# Patient Record
(demographics unavailable — no encounter records)

---

## 2024-10-07 NOTE — ASSESSMENT
[FreeTextEntry1] : Tick bite start Doxycycline 100mg 2 tabs once we'll check lyme titers today  HLD Discussed the importance of following a low cholesterol/low fat diet and increased physical activity.  Blood work ordered.to check Lyme titers Follow up in one week for lab results

## 2024-10-07 NOTE — PHYSICAL EXAM
[No Acute Distress] : no acute distress [Well Nourished] : well nourished [Well Developed] : well developed [Well-Appearing] : well-appearing [Normal Sclera/Conjunctiva] : normal sclera/conjunctiva [Normal Outer Ear/Nose] : the outer ears and nose were normal in appearance [No JVD] : no jugular venous distention [No Lymphadenopathy] : no lymphadenopathy [Supple] : supple [No Respiratory Distress] : no respiratory distress  [No Accessory Muscle Use] : no accessory muscle use [Clear to Auscultation] : lungs were clear to auscultation bilaterally [Normal Rate] : normal rate  [Regular Rhythm] : with a regular rhythm [Normal S1, S2] : normal S1 and S2 [No Murmur] : no murmur heard [No Edema] : there was no peripheral edema [Soft] : abdomen soft [Non Tender] : non-tender [Non-distended] : non-distended [Normal Posterior Cervical Nodes] : no posterior cervical lymphadenopathy [Normal Anterior Cervical Nodes] : no anterior cervical lymphadenopathy [No CVA Tenderness] : no CVA  tenderness [No Spinal Tenderness] : no spinal tenderness [No Joint Swelling] : no joint swelling [Grossly Normal Strength/Tone] : grossly normal strength/tone [No Rash] : no rash [Coordination Grossly Intact] : coordination grossly intact [No Focal Deficits] : no focal deficits [Normal Gait] : normal gait [Normal Affect] : the affect was normal [Normal Insight/Judgement] : insight and judgment were intact [de-identified] : erythematous flat circular area left lateral lower leg at site of tick bite  Adult

## 2024-10-07 NOTE — HISTORY OF PRESENT ILLNESS
[de-identified] : Ms. SUSI POWERS is a 44 year old female with Hx of anemia, thalassemia trait, asthma, ovarian cyst, and HLD, who presents for an acute visit.   [place] and then went out on [facility] yesterday for apple picking.  She found a tick on her left lateral lower leg last night States she pulled out the whole tick.  Denies any SOB, CP, abdominal pain, N/V/D, headache, dizziness, or leg swelling.

## 2024-10-07 NOTE — ADDENDUM
[FreeTextEntry1] : Documented by Charla Kearns acting as a scribe for Dr. Trinity Jay. 10/07/2024   All medical record entries made by the scribe were at my, Dr. Trinity Jay, direction and personally dictated by me on 10/07/2024. I have reviewed the chart and agree that the record accurately reflects my personal performance of the history, physical exam, assessment and plan. I have also personally directed, reviewed, and agreed with the chart.

## 2024-10-30 NOTE — PLAN
[FreeTextEntry1] : See plan.  Constipation gastroenterology referral  Pain of RT great toe  statr: diclofenac sodium 1% external gel as directed podiatry referral  RT shoulder pain orthopedic surgery referral   Bloodwork ordered. Pt. instructed to follow up for lab results.

## 2024-10-30 NOTE — HISTORY OF PRESENT ILLNESS
[de-identified] : Ms. SUSI POWERS is a 44-year-old female with hx. of anemia, thalassemia trait, asthma, Ovarian cyst and HLD, who presents for an acute visit. Patient reports she has quit smoking. States she started feeling constipated along with abdominal pain after she stopped smoking and has tried OTC laxative with no improvements. Denies abdominal bloating/melena/hematochezia. Admits she does not exercise regularly. Has not had a colonoscopy before nor followed up with GI. States she uses papaverine for pain and after experiencing flatulence, abdominal pain is gone. She c/o RT shoulder pain with limited ROM as well as pain in RT big toe. Pain is severe it wakes her up at night. Has taken advil, motrin and tylenol for pain with minimal improvement. Reports having a fall before the summer and feels pain ever since, that makes her unable to lie on her side.  Pt. admits her diet recently consists of some green salads and fruits as well as high carb diet. Denies CP, SOB, HA's, dizziness. She is otherwise well and offers no additional complaints.

## 2024-10-30 NOTE — REVIEW OF SYSTEMS
[Abdominal Pain] : abdominal pain [Constipation] : constipation [Negative] : Heme/Lymph [FreeTextEntry9] : +RT great toe pain +RT shoulder pain

## 2024-11-05 NOTE — PHYSICAL EXAM
[Rad] : radial 2+ and symmetric bilaterally [Normal] : Alert and in no acute distress [Poor Appearance] : well-appearing [Acute Distress] : not in acute distress [Obese] : not obese [de-identified] : The patient has no respiratory distress. Mood and affect are normal. The patient is alert and oriented to person, place and time. Examination of the cervical spine demonstrates no tenderness, no deformity and no muscle spasm. Cervical spine rotation is 60 to the right, 60 to the left, 75 of extension and 45 of flexion. Neurologic exam of the upper extremities reveals intact sensation to light touch. Motor function is 5 over 5 in all groups. Deep tendon reflexes are 2+ and equal at the biceps, triceps and brachioradialis. Examination of the right shoulder demonstrates no deformity. The skin is intact. There is no erythema. There is tenderness anteriorly. Impingement sign is positive There is no instability. Drop arm test is negative. Empty can test is negative. Liftoff test is negative. Moultrie test is negative.  She has right shoulder elevation of 105 degrees, external rotation of 40 and internal rotation to the lower lumbar level on the right.  The elbows are stable.  There is no lymphedema. [de-identified] : AP, transscapular and axillary x-rays of the right shoulder demonstrate no fracture, no dislocation and no bony abnormality.

## 2024-11-05 NOTE — DISCUSSION/SUMMARY
[de-identified] : The patient has tendinitis of the right shoulder.  The patient has stiffness of the right shoulder.  I have discussed the pathology, natural history and treatment options with her.  She is referred for physical therapy.  She is started on a course of naproxen.  Medication risks have been reviewed.  She will be reevaluated after 1 month of therapy.

## 2024-11-05 NOTE — HISTORY OF PRESENT ILLNESS
[de-identified] : 44-year-old RHD female presents for evaluation of right shoulder pain x 5 months. She reports about 6 months ago she had taken a fall on to the right shoulder, had pain for 1 week which had resolved. She complains of intermittent stabbing pain over the lateral shoulder worse with lying on the right side, overhead movement and carrying objects. She has tried Advil and Tylenol with some relief. Denies prior injuries.

## 2024-11-12 NOTE — HISTORY OF PRESENT ILLNESS
[de-identified] : 44 years old female with history of anemia/thalassemia trait/asthma/hyperlipidemia/ovarian cyst who presents today for acute visit.  Patient complaining nasal congestion and sinus pressure that started last week.  She also reports green mucus.  She denies any fever, chills, sore throat, cough.

## 2024-11-12 NOTE — REVIEW OF SYSTEMS
[Nasal Discharge] : nasal discharge [Sore Throat] : no sore throat [Negative] : Heme/Lymph [FreeTextEntry4] : + sinus pressure

## 2024-12-09 NOTE — ADDENDUM
[FreeTextEntry1] : Documented by Charla Kearns acting as a scribe for Dr. Trinity Jay. 12/09/2024   All medical record entries made by the scribe were at my, Dr. Trinity Jay, direction and personally dictated by me on 12/09/2024. I have reviewed the chart and agree that the record accurately reflects my personal performance of the history, physical exam, assessment and plan. I have also personally directed, reviewed, and agreed with the chart.

## 2024-12-09 NOTE — PHYSICAL EXAM
[No Acute Distress] : no acute distress [Well Nourished] : well nourished [Well Developed] : well developed [Well-Appearing] : well-appearing [Normal Sclera/Conjunctiva] : normal sclera/conjunctiva [Normal Outer Ear/Nose] : the outer ears and nose were normal in appearance [Normal TMs] : both tympanic membranes were normal [No JVD] : no jugular venous distention [No Lymphadenopathy] : no lymphadenopathy [Supple] : supple [No Respiratory Distress] : no respiratory distress  [No Accessory Muscle Use] : no accessory muscle use [Clear to Auscultation] : lungs were clear to auscultation bilaterally [Normal Rate] : normal rate  [Regular Rhythm] : with a regular rhythm [Normal S1, S2] : normal S1 and S2 [No Murmur] : no murmur heard [Pedal Pulses Present] : the pedal pulses are present [No Edema] : there was no peripheral edema [No Extremity Clubbing/Cyanosis] : no extremity clubbing/cyanosis [Soft] : abdomen soft [Non Tender] : non-tender [Non-distended] : non-distended [Normal Posterior Cervical Nodes] : no posterior cervical lymphadenopathy [Normal Anterior Cervical Nodes] : no anterior cervical lymphadenopathy [No CVA Tenderness] : no CVA  tenderness [No Spinal Tenderness] : no spinal tenderness [No Joint Swelling] : no joint swelling [Grossly Normal Strength/Tone] : grossly normal strength/tone [No Rash] : no rash [Coordination Grossly Intact] : coordination grossly intact [No Focal Deficits] : no focal deficits [Normal Gait] : normal gait [Normal Affect] : the affect was normal [Normal Insight/Judgement] : insight and judgment were intact [de-identified] : + PND

## 2024-12-09 NOTE — ASSESSMENT
[FreeTextEntry1] : Sinusitis Augmentin 875-125mg BID with food has Flonase at home increase po fluids  Pt to follow up in one week or sooner if symptoms persist or worsen.

## 2024-12-09 NOTE — PHYSICAL EXAM
[No Acute Distress] : no acute distress [Well Nourished] : well nourished [Well Developed] : well developed [Well-Appearing] : well-appearing [Normal Sclera/Conjunctiva] : normal sclera/conjunctiva [Normal Outer Ear/Nose] : the outer ears and nose were normal in appearance [Normal TMs] : both tympanic membranes were normal [No JVD] : no jugular venous distention [No Lymphadenopathy] : no lymphadenopathy [Supple] : supple [No Respiratory Distress] : no respiratory distress  [No Accessory Muscle Use] : no accessory muscle use [Clear to Auscultation] : lungs were clear to auscultation bilaterally [Normal Rate] : normal rate  [Regular Rhythm] : with a regular rhythm [Normal S1, S2] : normal S1 and S2 [No Murmur] : no murmur heard [Pedal Pulses Present] : the pedal pulses are present [No Edema] : there was no peripheral edema [No Extremity Clubbing/Cyanosis] : no extremity clubbing/cyanosis [Soft] : abdomen soft [Non Tender] : non-tender [Non-distended] : non-distended [Normal Posterior Cervical Nodes] : no posterior cervical lymphadenopathy [Normal Anterior Cervical Nodes] : no anterior cervical lymphadenopathy [No CVA Tenderness] : no CVA  tenderness [No Spinal Tenderness] : no spinal tenderness [No Joint Swelling] : no joint swelling [Grossly Normal Strength/Tone] : grossly normal strength/tone [No Rash] : no rash [Coordination Grossly Intact] : coordination grossly intact [No Focal Deficits] : no focal deficits [Normal Gait] : normal gait [Normal Affect] : the affect was normal [Normal Insight/Judgement] : insight and judgment were intact [de-identified] : + PND

## 2024-12-09 NOTE — HISTORY OF PRESENT ILLNESS
[de-identified] : Ms. SUSI POWERS is a 44 year old female with Hx of anemia, thalassemia trait, asthma, hyperlipidemia, who presents for an acute visit.   Pt c/o sinus pressure and mucus in the back of her throat that started last Wednesday.  Denies any SOB, CP, abdominal pain, N/V/D, headache, dizziness, or leg swelling.

## 2024-12-09 NOTE — HISTORY OF PRESENT ILLNESS
[de-identified] : Ms. SUSI POWERS is a 44 year old female with Hx of anemia, thalassemia trait, asthma, hyperlipidemia, who presents for an acute visit.   Pt c/o sinus pressure and mucus in the back of her throat that started last Wednesday.  Denies any SOB, CP, abdominal pain, N/V/D, headache, dizziness, or leg swelling.

## 2024-12-09 NOTE — ADDENDUM
[FreeTextEntry1] : Documented by Charla Kearns acting as a scribe for Dr. Trinity Jay. 12/09/2024   All medical record entries made by the scribe were at my, Dr. rTinity Jay, direction and personally dictated by me on 12/09/2024. I have reviewed the chart and agree that the record accurately reflects my personal performance of the history, physical exam, assessment and plan. I have also personally directed, reviewed, and agreed with the chart.

## 2025-05-05 NOTE — PLAN
[FreeTextEntry1] : Annual Physical   Abdominal bloating / constipation  start Colace 100mg qhs Referred to GI for colonoscopy   Left knee pain  Xray left knee Referred to Ortho  TMJ  start mouthguard to sleep   Ovarian cyst follows with Gynecology, Dr. Hardin   Bloodwork ordered.  Follow up in one week for lab results.

## 2025-05-05 NOTE — REVIEW OF SYSTEMS
[Negative] : Heme/Lymph [Constipation] : constipation [FreeTextEntry7] : constipation, abdominal bloating  [FreeTextEntry9] : left knee pain

## 2025-05-05 NOTE — HISTORY OF PRESENT ILLNESS
[de-identified] : Ms. SUSI POWERS is a 44-year-old female with Hx of anemia, thalassemia trait, asthma, hyperlipidemia, presenting for an annual physical.   Pt states she quit smoking in September and is feeling well. She reports she is scheduled to repeat mammogram this month after she had a clip placed following abnormal mammogram in 2024. She is not on any medications and taking vitamins for hair.   Pt is following with gynecology, Dr. Hardin, for ovarian cyst and states she is on birth control pills. Pt reports she was told elevated cancer antigen on 4/2024 labs were likely d/t ovarian cyst + polyp.   Pt c/o constipation and reports she started probiotic for 4 days. She states constipation improved but she developed abdominal bloating and gas. She has since stopped the probiotic and states she is having bowel movement once per week; denies any acid reflux.   Pt c/o intermittent left knee pain that started 3 months ago and denies recent falls/trauma. She states the pain is 10/10 and has been taking Advil and Tylenol prn. She reports pain in her knee when she moves her torso forward and reports difficulty getting up from squatting position.  Denies any SOB, CP, N/V/D, headache, dizziness.   Residential stability/Relationship stability

## 2025-05-05 NOTE — ADDENDUM
[FreeTextEntry1] : Documented by Catalina Candelaria acting as a scribe for Dr. Yen. 05/01/2025   All medical record entries made by the scribe were at my, Dr. Yen, direction and personally dictated by me on 05/01/2025. I have reviewed the chart an agree that the record accurately reflects my personal performance of the history, physical exam, assessment and plan. I have also personally directed, reviewed, and agreed with the chart.

## 2025-05-05 NOTE — PHYSICAL EXAM
[No Acute Distress] : no acute distress [Well Nourished] : well nourished [Well Developed] : well developed [Well-Appearing] : well-appearing [Normal Sclera/Conjunctiva] : normal sclera/conjunctiva [PERRL] : pupils equal round and reactive to light [EOMI] : extraocular movements intact [Normal Outer Ear/Nose] : the outer ears and nose were normal in appearance [Normal Oropharynx] : the oropharynx was normal [Normal TMs] : both tympanic membranes were normal [No JVD] : no jugular venous distention [No Lymphadenopathy] : no lymphadenopathy [Supple] : supple [Thyroid Normal, No Nodules] : the thyroid was normal and there were no nodules present [No Respiratory Distress] : no respiratory distress  [No Accessory Muscle Use] : no accessory muscle use [Clear to Auscultation] : lungs were clear to auscultation bilaterally [Normal Rate] : normal rate  [Regular Rhythm] : with a regular rhythm [Normal S1, S2] : normal S1 and S2 [No Murmur] : no murmur heard [No Carotid Bruits] : no carotid bruits [No Abdominal Bruit] : a ~M bruit was not heard ~T in the abdomen [No Varicosities] : no varicosities [Pedal Pulses Present] : the pedal pulses are present [No Edema] : there was no peripheral edema [No Palpable Aorta] : no palpable aorta [No Extremity Clubbing/Cyanosis] : no extremity clubbing/cyanosis [Soft] : abdomen soft [Non Tender] : non-tender [Non-distended] : non-distended [No Masses] : no abdominal mass palpated [No HSM] : no HSM [Normal Bowel Sounds] : normal bowel sounds [Normal Posterior Cervical Nodes] : no posterior cervical lymphadenopathy [Normal Anterior Cervical Nodes] : no anterior cervical lymphadenopathy [No CVA Tenderness] : no CVA  tenderness [No Spinal Tenderness] : no spinal tenderness [No Joint Swelling] : no joint swelling [Grossly Normal Strength/Tone] : grossly normal strength/tone [No Rash] : no rash [Coordination Grossly Intact] : coordination grossly intact [No Focal Deficits] : no focal deficits [Normal Gait] : normal gait [Deep Tendon Reflexes (DTR)] : deep tendon reflexes were 2+ and symmetric [Normal Affect] : the affect was normal [Normal Insight/Judgement] : insight and judgment were intact [de-identified] : + clicking of jaw  [de-identified] : + left knee tenderness with ROM

## 2025-05-05 NOTE — HISTORY OF PRESENT ILLNESS
[de-identified] : Ms. SUSI POWERS is a 44-year-old female with Hx of anemia, thalassemia trait, asthma, hyperlipidemia, presenting for an annual physical.   Pt states she quit smoking in September and is feeling well. She reports she is scheduled to repeat mammogram this month after she had a clip placed following abnormal mammogram in 2024. She is not on any medications and taking vitamins for hair.   Pt is following with gynecology, Dr. Hardin, for ovarian cyst and states she is on birth control pills. Pt reports she was told elevated cancer antigen on 4/2024 labs were likely d/t ovarian cyst + polyp.   Pt c/o constipation and reports she started probiotic for 4 days. She states constipation improved but she developed abdominal bloating and gas. She has since stopped the probiotic and states she is having bowel movement once per week; denies any acid reflux.   Pt c/o intermittent left knee pain that started 3 months ago and denies recent falls/trauma. She states the pain is 10/10 and has been taking Advil and Tylenol prn. She reports pain in her knee when she moves her torso forward and reports difficulty getting up from squatting position.  Denies any SOB, CP, N/V/D, headache, dizziness.

## 2025-05-05 NOTE — HEALTH RISK ASSESSMENT
[Good] : ~his/her~  mood as  good [No] : No [Little interest or pleasure doing things] : 1) Little interest or pleasure doing things [Feeling down, depressed, or hopeless] : 2) Feeling down, depressed, or hopeless [0] : 2) Feeling down, depressed, or hopeless: Not at all (0) [PHQ-2 Negative - No further assessment needed] : PHQ-2 Negative - No further assessment needed [Former] : Former [15-19] : 15-19 [< 15 Years] : < 15 Years [Patient reported mammogram was abnormal] : Patient reported mammogram was abnormal [Patient reported PAP Smear was normal] : Patient reported PAP Smear was normal [[id number]] : 0 [de-identified] : quit september 2024 [MammogramDate] : 2024 [MammogramComments] : due to repeat in May [PapSmearDate] : 2024

## 2025-05-05 NOTE — HEALTH RISK ASSESSMENT
[Good] : ~his/her~  mood as  good [No] : No [Little interest or pleasure doing things] : 1) Little interest or pleasure doing things [Feeling down, depressed, or hopeless] : 2) Feeling down, depressed, or hopeless [0] : 2) Feeling down, depressed, or hopeless: Not at all (0) [PHQ-2 Negative - No further assessment needed] : PHQ-2 Negative - No further assessment needed [Former] : Former [15-19] : 15-19 [< 15 Years] : < 15 Years [Patient reported mammogram was abnormal] : Patient reported mammogram was abnormal [Patient reported PAP Smear was normal] : Patient reported PAP Smear was normal [[id number]] : 0 [de-identified] : quit september 2024 [MammogramDate] : 2024 [MammogramComments] : due to repeat in May [PapSmearDate] : 2024

## 2025-05-05 NOTE — PHYSICAL EXAM
[No Acute Distress] : no acute distress [Well Nourished] : well nourished [Well Developed] : well developed [Well-Appearing] : well-appearing [Normal Sclera/Conjunctiva] : normal sclera/conjunctiva [PERRL] : pupils equal round and reactive to light [EOMI] : extraocular movements intact [Normal Outer Ear/Nose] : the outer ears and nose were normal in appearance [Normal Oropharynx] : the oropharynx was normal [Normal TMs] : both tympanic membranes were normal [No JVD] : no jugular venous distention [No Lymphadenopathy] : no lymphadenopathy [Supple] : supple [Thyroid Normal, No Nodules] : the thyroid was normal and there were no nodules present [No Respiratory Distress] : no respiratory distress  [No Accessory Muscle Use] : no accessory muscle use [Clear to Auscultation] : lungs were clear to auscultation bilaterally [Normal Rate] : normal rate  [Regular Rhythm] : with a regular rhythm [Normal S1, S2] : normal S1 and S2 [No Murmur] : no murmur heard [No Carotid Bruits] : no carotid bruits [No Abdominal Bruit] : a ~M bruit was not heard ~T in the abdomen [No Varicosities] : no varicosities [Pedal Pulses Present] : the pedal pulses are present [No Edema] : there was no peripheral edema [No Palpable Aorta] : no palpable aorta [No Extremity Clubbing/Cyanosis] : no extremity clubbing/cyanosis [Soft] : abdomen soft [Non Tender] : non-tender [Non-distended] : non-distended [No Masses] : no abdominal mass palpated [No HSM] : no HSM [Normal Bowel Sounds] : normal bowel sounds [Normal Posterior Cervical Nodes] : no posterior cervical lymphadenopathy [Normal Anterior Cervical Nodes] : no anterior cervical lymphadenopathy [No CVA Tenderness] : no CVA  tenderness [No Spinal Tenderness] : no spinal tenderness [No Joint Swelling] : no joint swelling [Grossly Normal Strength/Tone] : grossly normal strength/tone [No Rash] : no rash [Coordination Grossly Intact] : coordination grossly intact [No Focal Deficits] : no focal deficits [Normal Gait] : normal gait [Deep Tendon Reflexes (DTR)] : deep tendon reflexes were 2+ and symmetric [Normal Affect] : the affect was normal [Normal Insight/Judgement] : insight and judgment were intact [de-identified] : + clicking of jaw  [de-identified] : + left knee tenderness with ROM

## 2025-05-16 NOTE — HISTORY OF PRESENT ILLNESS
[de-identified] : SUSI POWERS is a 44 year old female presenting with 6 months of atraumatic left-sided greater than right anterior acute on chronic knee pain.  She denies any specific injury.  States she is on her feet a lot with work but denies any recent change in activity level.  Denies any swelling buckling catching locking of the knee.  States the pain is worse going downstairs in a deep squat position.  She is here today for further evaluation.

## 2025-05-16 NOTE — DISCUSSION/SUMMARY
[de-identified] : SUSI POWERS is a 44 year old female presenting with Acute on chronic left greater than right anterior nontraumatic knee pain consistent with patellofemoral pain syndrome.  X-rays negative. Less likely occult chondromalacia patella.  Plan: 1.  Discussed referral to physical therapy however patient declined.  Formal home exercise program provided 2.  Discussed use of knee sleeve with patellar cutout for added support in the short-term at work if needed for extra support 3.  Voltaren gel prescribed to be used as needed topically up to 4 times a day 4.  Patient will follow-up in 3 months if no improvement can consider formal PT versus MRI.
Patient

## 2025-05-16 NOTE — PHYSICAL EXAM
[de-identified] : Knee (left)   Inspection  Skin: normal  Effusion: normal  Bursa swelling: none   Palpation  Tenderness: None Location: None  Crepitus: none.  Defect: none.  Popliteal fullness: negative.   Flexion  Active ROM: normal  Passive ROM: normal    Extension  Normal     Straight Leg Raise- can perform  Motor strength  Flexion: 5/5  Extension: 5/5   Sensory index  Normal.   ACL/PCL tests  Lachman test: stable  Anterior drawer: stable  Posterior drawer: stable   MCL/LCL tests  MCL laxity: stable  LCL laxity: stable   Patellofemoral tests  Patellar grind test: negative  Patellar apprehension: negative   Meniscal tests  Rufina's test: normal  Thessalys: Normal   Double and single-leg squat with mild instability and anterior knee pain. [de-identified] : XR of left knee Date: 5/8/2025   Views: 3 views Performed at [facility]: Yes Impression: No significant abnormality identified.  No acute fracture dislocation or osteoarthritis.  No fracture dislocation or malalignment.  These images were personally reviewed with original findings documented as above.

## 2025-05-28 NOTE — ASSESSMENT
[FreeTextEntry1] : 44-year-old female history of anemia with recent iron studies with ferritin 25, hb 10.5 mcv 72 ( 5/25) Stopped smoking 9/25- and gained weight and developed constipation.  No family history of colorectal cancer.  She takes colace 100 mg daily with improved bms. No weight loss, no rectal bleeding. Due for followup mammo/sono as pt relates to me. She will schedule.  IMP: 1. likely functional constipation 2. Colon cancer screening, average risk 3. Microcytic anemia- exclude thal trait 4. bloating- pt states she avoids milk, diary PLAN: She was advised to undergo colonoscopy to which she  agreed. The procedure will be performed in [facility] with the assistance of an anesthesiologist. The procedure was explained in detail and she understood the risks of the procedure not limited  to infection, bleeding, perforation, risk of anesthesia and risk of IV site problems,emergency surgery, missed lesions  or non-diagnosis of colon cancer. She  was advised that she could not drive home alone, if the patient chooses to receive sedation. Further diagnostic and treatment recommendations will be based upon the procedure and any biopsies, if they are taken. 2. lactaid advised 3. PRN hyoscyamine

## 2025-05-28 NOTE — PHYSICAL EXAM

## 2025-05-28 NOTE — HISTORY OF PRESENT ILLNESS
[FreeTextEntry1] : 44-year-old female history of anemia with recent iron studies with ferritin 25, hb 10.5 mcv 72 ( 5/25) Stopped smoking 9/25- and gained weight and developed constipation.  No family history of colorectal cancer.  She takes colace 100 mg daily with improved bms. No weight loss, no rectal bleeding. Due for followup mammo/sono as pt relates to me. She will schedule.

## 2025-07-01 NOTE — PLAN
[FreeTextEntry1] : Acute visit   Bronchitis  Nebulizer treatment administered today  start Zpack 250mg as directed  start Medrol dose pack as directed  start Promethazine DM 6.25-15mg/5mL Oral syrup every 4-6 hours prn  start Ventolin inhaler  Xray chest  Pt to follow up in one week or sooner if Sx persist or worsen.  Joint pains  d/c vitamin D supplement  start K2D supplement   Left knee pain  follows with Ortho   Ovarian cyst follows with Gynecology, Dr. Hardin

## 2025-07-01 NOTE — ADDENDUM
[FreeTextEntry1] : Documented by Catalina Candelaria acting as a scribe for Dr. Yen. 07/01/2025   All medical record entries made by the scribe were at my, Dr. Yen, direction and personally dictated by me on 07/01/2025. I have reviewed the chart an agree that the record accurately reflects my personal performance of the history, physical exam, assessment and plan. I have also personally directed, reviewed, and agreed with the chart.

## 2025-07-01 NOTE — HISTORY OF PRESENT ILLNESS
[de-identified] : Ms. SUSI POWERS is a 44-year-old female with Hx of anemia, thalassemia trait, asthma, hyperlipidemia, presenting for an acute visit.   Pt c/o cough that began 3 weeks ago and became productive with green phlegm 1 week ago. She has PMHx asthma and denies any fever, chills, or sore throat.  Pt also c/o joint pains after starting vitamin D supplement.  Denies any SOB, CP, or abdominal pain.

## 2025-07-01 NOTE — PHYSICAL EXAM
[No Acute Distress] : no acute distress [Well Nourished] : well nourished [Well Developed] : well developed [Well-Appearing] : well-appearing [Normal Sclera/Conjunctiva] : normal sclera/conjunctiva [Normal Outer Ear/Nose] : the outer ears and nose were normal in appearance [Normal Oropharynx] : the oropharynx was normal [No JVD] : no jugular venous distention [No Lymphadenopathy] : no lymphadenopathy [Supple] : supple [No Respiratory Distress] : no respiratory distress  [No Accessory Muscle Use] : no accessory muscle use [Normal Rate] : normal rate  [Regular Rhythm] : with a regular rhythm [Normal S1, S2] : normal S1 and S2 [Pedal Pulses Present] : the pedal pulses are present [No Edema] : there was no peripheral edema [No Extremity Clubbing/Cyanosis] : no extremity clubbing/cyanosis [Soft] : abdomen soft [Non Tender] : non-tender [Non-distended] : non-distended [Normal Posterior Cervical Nodes] : no posterior cervical lymphadenopathy [Normal Anterior Cervical Nodes] : no anterior cervical lymphadenopathy [No CVA Tenderness] : no CVA  tenderness [No Spinal Tenderness] : no spinal tenderness [No Joint Swelling] : no joint swelling [Grossly Normal Strength/Tone] : grossly normal strength/tone [No Rash] : no rash [Coordination Grossly Intact] : coordination grossly intact [No Focal Deficits] : no focal deficits [Normal Gait] : normal gait [Normal Affect] : the affect was normal [Normal Insight/Judgement] : insight and judgment were intact [de-identified] : + wheezing